# Patient Record
Sex: MALE | Race: WHITE | NOT HISPANIC OR LATINO | Employment: FULL TIME | URBAN - NONMETROPOLITAN AREA
[De-identification: names, ages, dates, MRNs, and addresses within clinical notes are randomized per-mention and may not be internally consistent; named-entity substitution may affect disease eponyms.]

---

## 2018-03-07 ENCOUNTER — TRANSCRIBE ORDERS (OUTPATIENT)
Dept: ADMINISTRATIVE | Facility: HOSPITAL | Age: 28
End: 2018-03-07

## 2018-03-07 DIAGNOSIS — M79.89 LEFT ARM SWELLING: Primary | ICD-10-CM

## 2018-03-08 ENCOUNTER — HOSPITAL ENCOUNTER (OUTPATIENT)
Dept: ULTRASOUND IMAGING | Facility: HOSPITAL | Age: 28
Discharge: HOME | End: 2018-03-08
Admitting: NURSE PRACTITIONER

## 2018-03-08 DIAGNOSIS — M79.89 LEFT ARM SWELLING: ICD-10-CM

## 2018-03-08 PROCEDURE — 93924 LWR XTR VASC STDY BILAT: CPT | Performed by: SURGERY

## 2018-03-08 PROCEDURE — 93923 UPR/LXTR ART STDY 3+ LVLS: CPT

## 2018-03-27 ENCOUNTER — TRANSCRIBE ORDERS (OUTPATIENT)
Dept: ADMINISTRATIVE | Facility: HOSPITAL | Age: 28
End: 2018-03-27

## 2018-03-27 DIAGNOSIS — R20.2 PARESTHESIAS IN LEFT HAND: Primary | ICD-10-CM

## 2018-05-10 ENCOUNTER — HOSPITAL ENCOUNTER (OUTPATIENT)
Dept: NEUROLOGY | Facility: HOSPITAL | Age: 28
Discharge: HOME | End: 2018-05-10
Admitting: PHYSICIAN ASSISTANT

## 2018-05-10 DIAGNOSIS — R20.2 PARESTHESIAS IN LEFT HAND: ICD-10-CM

## 2018-05-10 PROCEDURE — 95909 NRV CNDJ TST 5-6 STUDIES: CPT | Performed by: PSYCHIATRY & NEUROLOGY

## 2018-05-10 PROCEDURE — 95909 NRV CNDJ TST 5-6 STUDIES: CPT

## 2018-05-10 PROCEDURE — 95886 MUSC TEST DONE W/N TEST COMP: CPT | Performed by: PSYCHIATRY & NEUROLOGY

## 2018-05-10 PROCEDURE — 95886 MUSC TEST DONE W/N TEST COMP: CPT

## 2019-01-17 ENCOUNTER — APPOINTMENT (OUTPATIENT)
Dept: LAB | Facility: HOSPITAL | Age: 29
End: 2019-01-17
Attending: OBSTETRICS & GYNECOLOGY

## 2019-01-17 ENCOUNTER — TRANSCRIBE ORDERS (OUTPATIENT)
Dept: LAB | Facility: HOSPITAL | Age: 29
End: 2019-01-17

## 2019-01-17 DIAGNOSIS — Z31.41 FERTILITY TESTING: Primary | ICD-10-CM

## 2019-01-17 LAB
CHARACTER SMN: ABNORMAL
COLLECTION METHOD SMN: ABNORMAL
LIQUEFACTION TIME SMN: NORMAL MIN
NON PROGRESSIVE MOTILITY: 6.7 %
RBC #/AREA SMN HPF: ABNORMAL /HPF
SEX ABSTIN DURATION TIME PATIENT: 5 DAYS
SLOW PROGRESSIVE MOTILITY: 4.7 %
SMI: 26 (ref 80–400)
SPEC CONTAINER SPEC: ABNORMAL
SPECIMEN VOL SMN: 2.3 ML
SPERM # SMN: 55.7 MILLIONS/ML
SPERM IMMOTILE NFR SMN: 86.4 %
SPERM MOTILE NFR SMN: 13.6 % MOTILE (ref 50–100)
SPERM PROG NFR SMN: 2.2 % (ref 25–100)
SPERM SMN: 3 % NORMAL (ref 15–100)
VISC SMN: NORMAL CP
WBC SMN QL: ABNORMAL /HPF
WHO STANDARD: ABNORMAL

## 2019-01-17 PROCEDURE — 89320 SEMEN ANAL VOL/COUNT/MOT: CPT | Performed by: OBSTETRICS & GYNECOLOGY

## 2019-03-22 ENCOUNTER — TRANSCRIBE ORDERS (OUTPATIENT)
Dept: LABOR AND DELIVERY | Facility: HOSPITAL | Age: 29
End: 2019-03-22

## 2019-03-22 ENCOUNTER — APPOINTMENT (OUTPATIENT)
Dept: LAB | Facility: HOSPITAL | Age: 29
End: 2019-03-22

## 2019-03-22 DIAGNOSIS — R86.9 NONSPECIFIC ABNORMAL FINDING IN SEMEN: Primary | ICD-10-CM

## 2019-03-22 LAB
CHARACTER SMN: ABNORMAL
COLLECTION METHOD SMN: ABNORMAL
LIQUEFACTION TIME SMN: NORMAL MIN
NON PROGRESSIVE MOTILITY: 13.8 %
SEX ABSTIN DURATION TIME PATIENT: 5 DAYS
SLOW PROGRESSIVE MOTILITY: 24.4 %
SMI: 40 (ref 80–400)
SPEC CONTAINER SPEC: ABNORMAL
SPECIMEN VOL SMN: 1 ML
SPERM # SMN: 24.6 MILLIONS/ML
SPERM IMMOTILE NFR SMN: 55.3 %
SPERM MOTILE NFR SMN: 44.7 % MOTILE (ref 50–100)
SPERM PROG NFR SMN: 6.5 % (ref 25–100)
SPERM SMN: 5.8 % NORMAL (ref 15–100)
VISC SMN: NORMAL CP
WBC SMN QL: ABNORMAL /HPF
WHO STANDARD: ABNORMAL

## 2019-03-22 PROCEDURE — 89320 SEMEN ANAL VOL/COUNT/MOT: CPT | Performed by: OBSTETRICS & GYNECOLOGY

## 2019-07-11 ENCOUNTER — TRANSCRIBE ORDERS (OUTPATIENT)
Dept: ADMINISTRATIVE | Facility: HOSPITAL | Age: 29
End: 2019-07-11

## 2019-07-11 ENCOUNTER — APPOINTMENT (OUTPATIENT)
Dept: LAB | Facility: HOSPITAL | Age: 29
End: 2019-07-11

## 2019-07-11 DIAGNOSIS — N46.9 INFERTILITY MALE: Primary | ICD-10-CM

## 2019-07-11 LAB
CHARACTER SMN: NORMAL
COLLECTION METHOD SMN: ABNORMAL
EPI CELLS #/AREA SMN HPF: ABNORMAL /HPF
LIQUEFACTION TIME SMN: NORMAL MIN
NON PROGRESSIVE MOTILITY: 7.4 %
RBC #/AREA SMN HPF: ABNORMAL /HPF
SEX ABSTIN DURATION TIME PATIENT: 4 DAYS
SLOW PROGRESSIVE MOTILITY: 14.7 %
SMI: 71 (ref 80–400)
SPEC CONTAINER SPEC: ABNORMAL
SPECIMEN VOL SMN: 1 ML
SPERM # SMN: 113.8 MILLIONS/ML
SPERM IMMOTILE NFR SMN: 76.8 %
SPERM MOTILE NFR SMN: 23.2 % MOTILE (ref 50–100)
SPERM PROG NFR SMN: 1.1 % (ref 25–100)
SPERM SMN: 4.3 % NORMAL (ref 15–100)
VISC SMN: ABNORMAL CP
WBC SMN QL: ABNORMAL /HPF
WHO STANDARD: ABNORMAL

## 2019-07-11 PROCEDURE — 89320 SEMEN ANAL VOL/COUNT/MOT: CPT | Performed by: UROLOGY

## 2022-09-07 ENCOUNTER — OFFICE VISIT (OUTPATIENT)
Age: 32
End: 2022-09-07
Payer: COMMERCIAL

## 2022-09-07 VITALS
DIASTOLIC BLOOD PRESSURE: 88 MMHG | HEART RATE: 92 BPM | BODY MASS INDEX: 37.29 KG/M2 | WEIGHT: 251.8 LBS | OXYGEN SATURATION: 98 % | TEMPERATURE: 98.2 F | HEIGHT: 69 IN | SYSTOLIC BLOOD PRESSURE: 134 MMHG | RESPIRATION RATE: 17 BRPM

## 2022-09-07 DIAGNOSIS — H10.32 ACUTE CONJUNCTIVITIS OF LEFT EYE, UNSPECIFIED ACUTE CONJUNCTIVITIS TYPE: Primary | ICD-10-CM

## 2022-09-07 PROCEDURE — 99214 OFFICE O/P EST MOD 30 MIN: CPT | Performed by: NURSE PRACTITIONER

## 2022-09-07 RX ORDER — TOBRAMYCIN AND DEXAMETHASONE 3; 1 MG/ML; MG/ML
2 SUSPENSION/ DROPS OPHTHALMIC
Qty: 5 ML | Refills: 0 | Status: SHIPPED | OUTPATIENT
Start: 2022-09-07 | End: 2022-09-17

## 2022-09-07 ASSESSMENT — ENCOUNTER SYMPTOMS
COLOR CHANGE: 0
TROUBLE SWALLOWING: 0
EYE PAIN: 0
PHOTOPHOBIA: 1
EYE REDNESS: 1
EYE DISCHARGE: 0
ABDOMINAL PAIN: 0
STRIDOR: 0
ABDOMINAL DISTENTION: 0
SINUS PRESSURE: 0
WHEEZING: 0
COUGH: 0
SORE THROAT: 0
SHORTNESS OF BREATH: 0
CHEST TIGHTNESS: 0
EYE ITCHING: 1

## 2022-09-07 NOTE — PATIENT INSTRUCTIONS
Fluorescein and black light exam performed, no abrasions/scratches noted, eye was then flushed out. Patient noted improvement in vision clarity after flushing. Sent in tobradex for patient no improvement in 24 hours follow-up with eye doctor immediately.   Educated on worrisome signs and when to go to ER

## 2022-09-07 NOTE — PROGRESS NOTES
[address]  [address] [address] [place] [address]  Dept: [phone]  Dept Fax: [phone]  Loc: [phone]    Gio Prieto is a 34 y.o. male who presents today for his medical conditions/complaints as noted below. Gio Prieto is complaining of Eye Pain (Left eye has been irritated and sensitive  to the light since Monday night Tuesday Morning after he cut his hair Monday night)        HPI:   HPI    Macon Moment reports left eye irritation that started Tuesday morning. Patient reports that he cut his hair on Monday night and thinks there may be hair in his eye. He reports sensitivity to light. Denies matting. History reviewed. No pertinent past medical history. History reviewed. No pertinent surgical history. History reviewed. No pertinent family history. Social History     Tobacco Use    Smoking status: Never    Smokeless tobacco: Never   Substance Use Topics    Alcohol use: Not Currently     Alcohol/week: 1.0 standard drink     Types: 1 Cans of beer per week        Current Outpatient Medications   Medication Sig Dispense Refill    tobramycin-dexamethasone (TOBRADEX) 0.3-0.1 % ophthalmic suspension Place 2 drops into the left eye every 4 hours (while awake) for 10 days 5 mL 0     No current facility-administered medications for this visit.        No Known Allergies    Health Maintenance   Topic Date Due    COVID-19 Vaccine (1) Never done    Varicella vaccine (1 of 2 - 2-dose childhood series) Never done    Depression Screen  Never done    HIV screen  Never done    Hepatitis C screen  Never done    DTaP/Tdap/Td vaccine (1 - Tdap) Never done    Flu vaccine (1) Never done    Hepatitis A vaccine  Aged Out    Hepatitis B vaccine  Aged Out    Hib vaccine  Aged Out    Meningococcal (ACWY) vaccine  Aged Out    Pneumococcal 0-64 years Vaccine  Aged Out       Subjective:   Review of Systems   Constitutional:  Negative for chills, fatigue and fever. HENT:  Negative for congestion, sinus pressure, sore throat and trouble swallowing. Eyes:  Positive for photophobia, redness and itching. Negative for pain and discharge. Respiratory:  Negative for cough, chest tightness, shortness of breath, wheezing and stridor. Cardiovascular:  Negative for chest pain and palpitations. Gastrointestinal:  Negative for abdominal distention and abdominal pain. Genitourinary:  Negative for difficulty urinating, dysuria and hematuria. Musculoskeletal:  Negative for arthralgias, neck pain and neck stiffness. Skin:  Negative for color change and rash. Neurological:  Negative for dizziness, syncope, speech difficulty, weakness and numbness. Psychiatric/Behavioral:  Negative for confusion and suicidal ideas. Objective    Physical Exam  Vitals and nursing note reviewed. Constitutional:       General: He is not in acute distress. Appearance: Normal appearance. HENT:      Head: Normocephalic. Right Ear: External ear normal.      Left Ear: External ear normal.      Nose: Nose normal. No congestion. Mouth/Throat:      Mouth: Mucous membranes are moist.      Pharynx: Oropharynx is clear. No posterior oropharyngeal erythema. Eyes:      Conjunctiva/sclera: Conjunctivae normal.      Pupils: Pupils are equal, round, and reactive to light. Comments: Patient conjunctiva and sclera red and irritated. No scratch/abrasion identified with dye and black light. Cardiovascular:      Rate and Rhythm: Normal rate and regular rhythm. Pulses: Normal pulses. Heart sounds: Normal heart sounds. No murmur heard. Pulmonary:      Effort: Pulmonary effort is normal. No respiratory distress. Breath sounds: Normal breath sounds. No stridor. No wheezing. Abdominal:      General: Abdomen is flat. Bowel sounds are normal. There is no distension. Tenderness: There is no abdominal tenderness.    Musculoskeletal:         General: No swelling or deformity. Normal range of motion. Cervical back: Normal range of motion. No rigidity or tenderness. Skin:     General: Skin is warm and dry. Findings: No rash. Neurological:      General: No focal deficit present. Mental Status: He is alert and oriented to person, place, and time. Sensory: No sensory deficit. /88   Pulse 92   Temp 98.2 °F (36.8 °C)   Resp 17   Ht 5' 9\" (1.753 m)   Wt 251 lb 12.8 oz (114.2 kg)   SpO2 98%   BMI 37.18 kg/m²     Assessment         Diagnosis Orders   1. Acute conjunctivitis of left eye, unspecified acute conjunctivitis type  tobramycin-dexamethasone (TOBRADEX) 0.3-0.1 % ophthalmic suspension          Plan   Fluorescein and black light exam performed, no abrasions/scratches noted, eye was then flushed out. Patient noted improvement in vision clarity after flushing. Sent in tobradex for patient no improvement in 24 hours follow-up with eye doctor immediately. Educated on worrisome signs and when to go to ER  No orders of the defined types were placed in this encounter. No results found for this visit on 09/07/22. Orders Placed This Encounter   Medications    tobramycin-dexamethasone (TOBRADEX) 0.3-0.1 % ophthalmic suspension     Sig: Place 2 drops into the left eye every 4 hours (while awake) for 10 days     Dispense:  5 mL     Refill:  0      New Prescriptions    TOBRAMYCIN-DEXAMETHASONE (TOBRADEX) 0.3-0.1 % OPHTHALMIC SUSPENSION    Place 2 drops into the left eye every 4 hours (while awake) for 10 days        No follow-ups on file. Discussed use, benefits, and side effects of any prescribed medications. All patient questions were answered. Patient voiced understanding of care plan. Patient was given educational materials - see patient instructions below. Patient Instructions   Fluorescein and black light exam performed, no abrasions/scratches noted, eye was then flushed out.   Patient noted improvement in vision clarity after flushing. Sent in tobradex for patient no improvement in 24 hours follow-up with eye doctor immediately.   Educated on worrisome signs and when to go to ER        Electronically signed by MAXIMO Sylvester CNP on 9/7/2022 at 5:56 PM

## 2023-03-15 ENCOUNTER — OFFICE VISIT (OUTPATIENT)
Dept: INTERNAL MEDICINE | Facility: CLINIC | Age: 33
End: 2023-03-15
Payer: COMMERCIAL

## 2023-03-15 VITALS
DIASTOLIC BLOOD PRESSURE: 90 MMHG | HEIGHT: 70 IN | SYSTOLIC BLOOD PRESSURE: 132 MMHG | OXYGEN SATURATION: 98 % | TEMPERATURE: 96.8 F | WEIGHT: 244 LBS | HEART RATE: 72 BPM | BODY MASS INDEX: 34.93 KG/M2

## 2023-03-15 DIAGNOSIS — E55.9 VITAMIN D DEFICIENCY: Primary | ICD-10-CM

## 2023-03-15 DIAGNOSIS — R53.83 OTHER FATIGUE: ICD-10-CM

## 2023-03-15 DIAGNOSIS — E55.9 VITAMIN D DEFICIENCY: ICD-10-CM

## 2023-03-15 DIAGNOSIS — F33.9 EPISODE OF RECURRENT MAJOR DEPRESSIVE DISORDER, UNSPECIFIED DEPRESSION EPISODE SEVERITY: ICD-10-CM

## 2023-03-15 DIAGNOSIS — E66.9 OBESITY (BMI 35.0-39.9 WITHOUT COMORBIDITY): ICD-10-CM

## 2023-03-15 PROCEDURE — 99204 OFFICE O/P NEW MOD 45 MIN: CPT | Performed by: INTERNAL MEDICINE

## 2023-03-15 RX ORDER — FLUOXETINE HYDROCHLORIDE 20 MG/1
20 CAPSULE ORAL DAILY
Qty: 30 CAPSULE | Refills: 2 | Status: SHIPPED | OUTPATIENT
Start: 2023-03-15

## 2023-03-15 NOTE — PROGRESS NOTES
"      Chief Complaint  Anxiety (Ongoing issue, but seems to have worsened over the last few month ) and Depression    Subjective        Slade Shah presents to [facility]    HPI    Patient has had issues with depression for over a year.  Patient reports it impacts him at home significantly.  Patient does not have any SI/HI.  Patient has never been on medication before.  Patient reports some anxiety, but it is somewhat of more over stressing over certain issues.      Various treatment options discussed, I think fluoxetine would be a great option for him as more weight neutral and is adequate for depression and anxiety.  We discussed sexual side effects of these medications.    Not aware of his snores, not excessively tired.  Consider sleep apnea work-up if needed.    Review of Systems    Objective   Vital Signs:  /90 (BP Location: Left arm, Patient Position: Sitting, Cuff Size: Adult)   Pulse 72   Temp 96.8 °F (36 °C) (Temporal)   Ht 177.8 cm (70\")   Wt 111 kg (244 lb)   SpO2 98%   BMI 35.01 kg/m²   Estimated body mass index is 35.01 kg/m² as calculated from the following:    Height as of this encounter: 177.8 cm (70\").    Weight as of this encounter: 111 kg (244 lb).      Physical Exam  Vitals reviewed.   Constitutional:       Appearance: He is obese. He is not ill-appearing.   Cardiovascular:      Rate and Rhythm: Normal rate and regular rhythm.   Pulmonary:      Effort: Pulmonary effort is normal. No respiratory distress.   Skin:     General: Skin is warm and dry.   Neurological:      General: No focal deficit present.      Mental Status: He is alert and oriented to person, place, and time.   Psychiatric:         Mood and Affect: Mood normal.         Behavior: Behavior normal.                     Assessment and Plan   Diagnoses and all orders for this visit:    1. Vitamin D deficiency (Primary)  -     Vitamin D 25 hydroxy; Future    2. Other fatigue  -     CBC w " AUTO Differential; Future  -     TSH Rfx On Abnormal To Free T4; Future  -     Testosterone, Free, Total; Future  -     Vitamin B12; Future    3. Episode of recurrent major depressive disorder, unspecified depression episode severity (HCC)  -     FLUoxetine (PROzac) 20 MG capsule; Take 1 capsule by mouth Daily.  Dispense: 30 capsule; Refill: 2    4. Obesity (BMI 35.0-39.9 without comorbidity)  -     Comprehensive metabolic panel; Future  -     Hemoglobin A1c; Future  -     Lipid panel; Future      PHQ-9 Depression Screening  Little interest or pleasure in doing things? 2-->more than half the days   Feeling down, depressed, or hopeless? 2-->more than half the days   Trouble falling or staying asleep, or sleeping too much? 1-->several days   Feeling tired or having little energy? 1-->several days   Poor appetite or overeating? 0-->not at all   Feeling bad about yourself - or that you are a failure or have let yourself or your family down? 1-->several days   Trouble concentrating on things, such as reading the newspaper or watching television? 0-->not at all   Moving or speaking so slowly that other people could have noticed? Or the opposite - being so fidgety or restless that you have been moving around a lot more than usual? 1-->several days   Thoughts that you would be better off dead, or of hurting yourself in some way? 0-->not at all   PHQ-9 Total Score 8   If you checked off any problems, how difficult have these problems made it for you to do your work, take care of things at home, or get along with other people? somewhat difficult     Counseled on weight loss, increasing activity to get  mins of exercise per week.  Portion control.    Patient does not drink alcohol in excess.  Patient does vape, but is not interested in quitting at present.        Result Review :           Class 2 Severe Obesity (BMI >=35 and <=39.9). Obesity-related health conditions include the following: none. Obesity is improving with  lifestyle modifications. BMI is is above average; BMI management plan is completed. We discussed portion control, increasing exercise and an ervin-based approach such as MyFitness Pal or Lose It.           Follow Up   Return in about 1 month (around 4/15/2023), or if symptoms worsen or fail to improve, for Next scheduled follow up.  Patient was given instructions and counseling regarding his condition or for health maintenance advice. Please see specific information pulled into the AVS if appropriate.       CONY Lyn MD, FACP, FHM      Electronically signed by Alex Lyn MD, 03/15/23, 8:02 AM CDT.

## 2023-03-20 DIAGNOSIS — E55.9 VITAMIN D DEFICIENCY: Primary | ICD-10-CM

## 2023-03-20 LAB
25(OH)D3+25(OH)D2 SERPL-MCNC: 13.4 NG/ML (ref 30–100)
ALBUMIN SERPL-MCNC: 4.6 G/DL (ref 3.5–5.2)
ALBUMIN/GLOB SERPL: 1.5 G/DL
ALP SERPL-CCNC: 49 U/L (ref 39–117)
ALT SERPL-CCNC: 39 U/L (ref 1–41)
AST SERPL-CCNC: 31 U/L (ref 1–40)
BASOPHILS # BLD AUTO: 0.06 10*3/MM3 (ref 0–0.2)
BASOPHILS NFR BLD AUTO: 1 % (ref 0–1.5)
BILIRUB SERPL-MCNC: 1 MG/DL (ref 0–1.2)
BUN SERPL-MCNC: 16 MG/DL (ref 6–20)
BUN/CREAT SERPL: 16.7 (ref 7–25)
CALCIUM SERPL-MCNC: 10.1 MG/DL (ref 8.6–10.5)
CHLORIDE SERPL-SCNC: 102 MMOL/L (ref 98–107)
CHOLEST SERPL-MCNC: 144 MG/DL (ref 0–200)
CO2 SERPL-SCNC: 25.1 MMOL/L (ref 22–29)
CREAT SERPL-MCNC: 0.96 MG/DL (ref 0.76–1.27)
EGFRCR SERPLBLD CKD-EPI 2021: 107 ML/MIN/1.73
EOSINOPHIL # BLD AUTO: 0.11 10*3/MM3 (ref 0–0.4)
EOSINOPHIL NFR BLD AUTO: 1.8 % (ref 0.3–6.2)
ERYTHROCYTE [DISTWIDTH] IN BLOOD BY AUTOMATED COUNT: 13 % (ref 12.3–15.4)
GLOBULIN SER CALC-MCNC: 3.1 GM/DL
GLUCOSE SERPL-MCNC: 96 MG/DL (ref 65–99)
HBA1C MFR BLD: 5.5 % (ref 4.8–5.6)
HCT VFR BLD AUTO: 44.2 % (ref 37.5–51)
HDLC SERPL-MCNC: 31 MG/DL (ref 40–60)
HGB BLD-MCNC: 14.6 G/DL (ref 13–17.7)
IMM GRANULOCYTES # BLD AUTO: 0.03 10*3/MM3 (ref 0–0.05)
IMM GRANULOCYTES NFR BLD AUTO: 0.5 % (ref 0–0.5)
LDLC SERPL CALC-MCNC: 99 MG/DL (ref 0–100)
LYMPHOCYTES # BLD AUTO: 2.29 10*3/MM3 (ref 0.7–3.1)
LYMPHOCYTES NFR BLD AUTO: 38 % (ref 19.6–45.3)
MCH RBC QN AUTO: 29 PG (ref 26.6–33)
MCHC RBC AUTO-ENTMCNC: 33 G/DL (ref 31.5–35.7)
MCV RBC AUTO: 87.9 FL (ref 79–97)
MONOCYTES # BLD AUTO: 0.48 10*3/MM3 (ref 0.1–0.9)
MONOCYTES NFR BLD AUTO: 8 % (ref 5–12)
NEUTROPHILS # BLD AUTO: 3.06 10*3/MM3 (ref 1.7–7)
NEUTROPHILS NFR BLD AUTO: 50.7 % (ref 42.7–76)
NRBC BLD AUTO-RTO: 0 /100 WBC (ref 0–0.2)
PLATELET # BLD AUTO: 449 10*3/MM3 (ref 140–450)
POTASSIUM SERPL-SCNC: 5 MMOL/L (ref 3.5–5.2)
PROT SERPL-MCNC: 7.7 G/DL (ref 6–8.5)
RBC # BLD AUTO: 5.03 10*6/MM3 (ref 4.14–5.8)
SODIUM SERPL-SCNC: 139 MMOL/L (ref 136–145)
TESTOST FREE SERPL-MCNC: 15.8 PG/ML (ref 8.7–25.1)
TESTOST SERPL-MCNC: 600 NG/DL (ref 264–916)
TRIGL SERPL-MCNC: 71 MG/DL (ref 0–150)
TSH SERPL DL<=0.005 MIU/L-ACNC: 0.8 UIU/ML (ref 0.27–4.2)
VIT B12 SERPL-MCNC: 365 PG/ML (ref 211–946)
VLDLC SERPL CALC-MCNC: 14 MG/DL (ref 5–40)
WBC # BLD AUTO: 6.03 10*3/MM3 (ref 3.4–10.8)

## 2023-03-20 RX ORDER — ERGOCALCIFEROL 1.25 MG/1
50000 CAPSULE ORAL WEEKLY
Qty: 5 CAPSULE | Refills: 0 | Status: SHIPPED | OUTPATIENT
Start: 2023-03-20

## 2023-04-13 ENCOUNTER — OFFICE VISIT (OUTPATIENT)
Dept: INTERNAL MEDICINE | Facility: CLINIC | Age: 33
End: 2023-04-13
Payer: COMMERCIAL

## 2023-04-13 ENCOUNTER — PATIENT ROUNDING (BHMG ONLY) (OUTPATIENT)
Dept: INTERNAL MEDICINE | Facility: CLINIC | Age: 33
End: 2023-04-13
Payer: COMMERCIAL

## 2023-04-13 VITALS
OXYGEN SATURATION: 99 % | HEIGHT: 70 IN | HEART RATE: 78 BPM | TEMPERATURE: 97.8 F | SYSTOLIC BLOOD PRESSURE: 124 MMHG | DIASTOLIC BLOOD PRESSURE: 100 MMHG | WEIGHT: 240 LBS | BODY MASS INDEX: 34.36 KG/M2

## 2023-04-13 DIAGNOSIS — F41.9 ANXIETY: ICD-10-CM

## 2023-04-13 DIAGNOSIS — E55.9 VITAMIN D DEFICIENCY: ICD-10-CM

## 2023-04-13 DIAGNOSIS — E66.09 CLASS 1 OBESITY DUE TO EXCESS CALORIES WITH SERIOUS COMORBIDITY AND BODY MASS INDEX (BMI) OF 34.0 TO 34.9 IN ADULT: Primary | ICD-10-CM

## 2023-04-13 PROBLEM — E66.811 CLASS 1 OBESITY DUE TO EXCESS CALORIES WITH SERIOUS COMORBIDITY AND BODY MASS INDEX (BMI) OF 34.0 TO 34.9 IN ADULT: Status: ACTIVE | Noted: 2023-04-13

## 2023-04-13 PROCEDURE — 99213 OFFICE O/P EST LOW 20 MIN: CPT | Performed by: INTERNAL MEDICINE

## 2023-04-13 NOTE — PROGRESS NOTES
"      Chief Complaint  Follow-up (Follow up from 3/15/2023 prozac /Anxiety doing much better )    Subjective        Slade Shah presents to [facility]    HPI    Patient presents for follow-up.  Patient is down 4 pounds.  The patient's anxiety is much better.  Patient tolerating the fluoxetine without any significant issues.  Patient did not know that he had a prescription for the vitamin D that was sent in last month.  Recommended that he pick this up, if he does not pick this up recommend getting over-the-counter vitamin D.        Review of Systems  Answers for HPI/ROS submitted by the patient on 4/13/2023  What is the primary reason for your visit?: Other  Please describe your symptoms.: No symptoms  Have you had these symptoms before?: No  How long have you been having these symptoms?: 1-4 days  Please list any medications you are currently taking for this condition.: Fluoxetine      Objective   Vital Signs:  /100 (BP Location: Left arm, Patient Position: Sitting, Cuff Size: Adult)   Pulse 78   Temp 97.8 °F (36.6 °C) (Temporal)   Ht 177.8 cm (70\")   Wt 109 kg (240 lb)   SpO2 99%   BMI 34.44 kg/m²   Estimated body mass index is 34.44 kg/m² as calculated from the following:    Height as of this encounter: 177.8 cm (70\").    Weight as of this encounter: 109 kg (240 lb).      Physical Exam  Vitals reviewed.   Constitutional:       Appearance: He is obese.   HENT:      Mouth/Throat:      Mouth: Mucous membranes are dry.      Pharynx: Oropharynx is clear.   Eyes:      General: No scleral icterus.     Conjunctiva/sclera: Conjunctivae normal.   Cardiovascular:      Rate and Rhythm: Normal rate and regular rhythm.   Pulmonary:      Effort: Pulmonary effort is normal. No respiratory distress.   Skin:     General: Skin is warm and dry.   Neurological:      General: No focal deficit present.      Mental Status: He is alert and oriented to person, place, and time. "   Psychiatric:         Mood and Affect: Mood normal.         Behavior: Behavior normal.                     Assessment and Plan   Diagnoses and all orders for this visit:    1. Class 1 obesity due to excess calories with serious comorbidity and body mass index (BMI) of 34.0 to 34.9 in adult (Primary)    2. Anxiety    3. Vitamin D deficiency      Patient seems to be tolerating the medication and doing well.  Recommend we continue the Prozac at this point time.  Patient is not having any significant adverse effects.  Encouraged the patient to be active, exercise, and work on weight loss as this also can help with anxiety and depression.  Also discussed with the patient making sure to  the vitamin D, if I need to resend the prescription I will be happy to do so.      Result Review :  The following data was reviewed by: Alex Lyn MD on 04/13/2023:  CMP        3/15/2023    07:08   CMP   Glucose 96     BUN 16     Creatinine 0.96     Sodium 139     Potassium 5.0     Chloride 102     Calcium 10.1     Total Protein 7.7     Albumin 4.6     Globulin 3.1     Total Bilirubin 1.0     Alkaline Phosphatase 49     AST (SGOT) 31     ALT (SGPT) 39     BUN/Creatinine Ratio 16.7       CBC w/diff        3/15/2023    07:08   CBC w/Diff   WBC 6.03     RBC 5.03     Hemoglobin 14.6     Hematocrit 44.2     MCV 87.9     MCH 29.0     MCHC 33.0     RDW 13.0     Platelets 449     Neutrophil Rel % 50.7     Lymphocyte Rel % 38.0     Monocyte Rel % 8.0     Eosinophil Rel % 1.8     Basophil Rel % 1.0       Lipid Panel        3/15/2023    07:08   Lipid Panel   Total Cholesterol 144     Triglycerides 71     HDL Cholesterol 31     VLDL Cholesterol 14     LDL Cholesterol  99       TSH        3/15/2023    07:08   TSH   TSH 0.797       A1C Last 3 Results        3/15/2023    07:08   HGBA1C Last 3 Results   Hemoglobin A1C 5.50                       Follow Up   Return in about 3 months (around 7/13/2023), or if symptoms worsen or fail to  improve, for Recheck.  Patient was given instructions and counseling regarding his condition or for health maintenance advice. Please see specific information pulled into the AVS if appropriate.       CONY Lyn MD, FACP, [facility]      Electronically signed by Alex Lyn MD, 04/13/23, 8:02 AM CDT.

## 2023-04-13 NOTE — PROGRESS NOTES
A My-Chart message has been sent to the patient for PATIENT ROUNDING with [facility].

## 2023-05-05 ENCOUNTER — TELEPHONE (OUTPATIENT)
Dept: INTERNAL MEDICINE | Facility: CLINIC | Age: 33
End: 2023-05-05

## 2023-05-05 DIAGNOSIS — E55.9 VITAMIN D DEFICIENCY: ICD-10-CM

## 2023-05-05 RX ORDER — ERGOCALCIFEROL 1.25 MG/1
50000 CAPSULE ORAL WEEKLY
Qty: 5 CAPSULE | Refills: 0 | Status: CANCELLED | OUTPATIENT
Start: 2023-05-05

## 2023-05-05 NOTE — TELEPHONE ENCOUNTER
Caller: Slade Shah    Relationship: Self    Best call back number: [phone]    What is the best time to reach you: ANY    Who are you requesting to speak with (clinical staff, provider,  specific staff member): CLINICAL       What was the call regarding: NO REFILLS REMAINING, DOES HE STILL NEED TO CONTINUE THE VIT. D.  PLEASE ADVISE.      Do you require a callback: YES

## 2023-06-27 ENCOUNTER — TELEPHONE (OUTPATIENT)
Dept: INTERNAL MEDICINE | Facility: CLINIC | Age: 33
End: 2023-06-27

## 2023-06-27 NOTE — TELEPHONE ENCOUNTER
Caller: Slade Shah    Relationship: Self    Best call back number: [phone]     What is the best time to reach you: ANY    Who are you requesting to speak with (clinical staff, provider,  specific staff member): CLINICAL    Do you know the name of the person who called: SELF    What was the call regarding: NEEDS TO CONFIRM IF THE APPOINTMENT ON 7.3.23 IS REQUIRED SINCE PATIENT IS OUT OF REFILLS OR HE CAN WAIT UNTIL THE 7.31.23 IF REFILLS CAN BE SENT IN.     Is it okay if the provider responds through [employer]: YES

## 2024-01-12 ENCOUNTER — OFFICE VISIT (OUTPATIENT)
Dept: INTERNAL MEDICINE | Facility: CLINIC | Age: 34
End: 2024-01-12
Payer: COMMERCIAL

## 2024-01-12 VITALS
DIASTOLIC BLOOD PRESSURE: 90 MMHG | TEMPERATURE: 98.1 F | OXYGEN SATURATION: 98 % | BODY MASS INDEX: 35.39 KG/M2 | HEIGHT: 70 IN | WEIGHT: 247.2 LBS | HEART RATE: 83 BPM | SYSTOLIC BLOOD PRESSURE: 142 MMHG

## 2024-01-12 DIAGNOSIS — I10 PRIMARY HYPERTENSION: ICD-10-CM

## 2024-01-12 DIAGNOSIS — N46.9 MALE INFERTILITY: Primary | ICD-10-CM

## 2024-01-12 DIAGNOSIS — Z23 NEED FOR INFLUENZA VACCINATION: ICD-10-CM

## 2024-01-12 DIAGNOSIS — E55.9 VITAMIN D DEFICIENCY: ICD-10-CM

## 2024-01-12 DIAGNOSIS — E66.01 CLASS 2 SEVERE OBESITY DUE TO EXCESS CALORIES WITH SERIOUS COMORBIDITY AND BODY MASS INDEX (BMI) OF 35.0 TO 35.9 IN ADULT: ICD-10-CM

## 2024-01-12 DIAGNOSIS — F41.9 ANXIETY: ICD-10-CM

## 2024-01-12 PROBLEM — E66.09 CLASS 1 OBESITY DUE TO EXCESS CALORIES WITH SERIOUS COMORBIDITY AND BODY MASS INDEX (BMI) OF 34.0 TO 34.9 IN ADULT: Status: RESOLVED | Noted: 2023-04-13 | Resolved: 2024-01-12

## 2024-01-12 PROBLEM — E66.811 CLASS 1 OBESITY DUE TO EXCESS CALORIES WITH SERIOUS COMORBIDITY AND BODY MASS INDEX (BMI) OF 34.0 TO 34.9 IN ADULT: Status: RESOLVED | Noted: 2023-04-13 | Resolved: 2024-01-12

## 2024-01-12 PROBLEM — E66.812 CLASS 2 SEVERE OBESITY DUE TO EXCESS CALORIES WITH SERIOUS COMORBIDITY AND BODY MASS INDEX (BMI) OF 35.0 TO 35.9 IN ADULT: Status: ACTIVE | Noted: 2024-01-12

## 2024-01-12 RX ORDER — TRIAMCINOLONE ACETONIDE 1 MG/G
25 CREAM TOPICAL DAILY
Qty: 30 TABLET | Refills: 0 | Status: SHIPPED | OUTPATIENT
Start: 2024-01-12

## 2024-01-12 NOTE — PROGRESS NOTES
"      Chief Complaint  Depression (6 month follow up//Pts next appt after 3/15 needs to be an annual.//Pt received flu vaccine today.) and Vitamin D Deficiency    Subjective        Slademichael Shah presents to [facility]    HPI  Patient here for the above problems.  See Assessment and Plan for further HPI components.        Review of Systems    Objective   Vital Signs:  /90 (BP Location: Left arm, Patient Position: Sitting, Cuff Size: Adult)   Pulse 83   Temp 98.1 °F (36.7 °C) (Temporal)   Ht 177.8 cm (70\")   Wt 112 kg (247 lb 3.2 oz)   SpO2 98%   BMI 35.47 kg/m²   Estimated body mass index is 35.47 kg/m² as calculated from the following:    Height as of this encounter: 177.8 cm (70\").    Weight as of this encounter: 112 kg (247 lb 3.2 oz).      Physical Exam  Vitals reviewed.   Constitutional:       Appearance: He is obese.   Cardiovascular:      Rate and Rhythm: Normal rate and regular rhythm.   Pulmonary:      Effort: Pulmonary effort is normal. No respiratory distress.   Neurological:      General: No focal deficit present.      Mental Status: He is alert and oriented to person, place, and time.   Psychiatric:         Mood and Affect: Mood normal.         Behavior: Behavior normal.                       Assessment and Plan   Diagnoses and all orders for this visit:    1. Male infertility (Primary)  -     clomiPHENE (Clomid) 50 MG tablet; Take 0.5 tablets by mouth Daily.  Dispense: 30 tablet; Refill: 0    2. Vitamin D deficiency  -     Vitamin D,25-Hydroxy    3. Need for influenza vaccination  -     Fluzone (or Fluarix & Flulaval for VFC) >6mos    4. Anxiety    5. Class 2 severe obesity due to excess calories with serious comorbidity and body mass index (BMI) of 35.0 to 35.9 in adult    6. Primary hypertension      Anxiety stable.  No issues.  Tolerating prozac.  Sometimes still irritable but not outside the realm of normal behavior.  Continue prozac.    Vitamin D, he " has been taking medication.  Patient feels better.  Will check level today.    Patient has a history of infertility.  He went to specialist and was found to have low sperm count.  Patient was treated with clomid and had good success with this.  He would prefer not to go back to the specialist if he doesn't have to.  I will go ahead and prescribe low dose clomide.  Results on utility of offlabel use clomid has been variable.  But since he tolerated and had success previously, will go ahead and give it a try.  Will start with 60 days.      Patient has hypertension.  BP is not at goal  The patient's BP goal is < 130/80.  Recommend ambulatory BP monitoring after patient has taken medication.  Recommend varying the times of the blood pressure readings.  Patient is currently on none.  Recommend we continue current regimen.  Recommend ambulatory BP monitoring.  Recommend checking different times of the day at least 3-5 times per weeks.  [email or web address].[email or web address] has listing of some good BP cuffs.       Result Review :                   Class 2 Severe Obesity (BMI >=35 and <=39.9). Obesity-related health conditions include the following: hypertension. Obesity is worsening. BMI is is above average; BMI management plan is completed. We discussed portion control and increasing exercise.            Follow Up   Return in about 6 months (around 7/12/2024), or if symptoms worsen or fail to improve, for Annual physical, Recheck.  Patient was given instructions and counseling regarding his condition or for health maintenance advice. Please see specific information pulled into the AVS if appropriate.       CONY Lyn MD, FACP, [facility]      Electronically signed by Alex Lyn MD, 01/12/24, 1:03 PM CST.

## 2024-01-13 LAB — 25(OH)D3+25(OH)D2 SERPL-MCNC: 39.1 NG/ML (ref 30–100)

## 2024-02-05 DIAGNOSIS — F33.9 EPISODE OF RECURRENT MAJOR DEPRESSIVE DISORDER, UNSPECIFIED DEPRESSION EPISODE SEVERITY: ICD-10-CM

## 2024-02-05 RX ORDER — FLUOXETINE HYDROCHLORIDE 20 MG/1
20 CAPSULE ORAL DAILY
Qty: 90 CAPSULE | Refills: 3 | Status: SHIPPED | OUTPATIENT
Start: 2024-02-05

## 2024-07-03 DIAGNOSIS — F33.9 EPISODE OF RECURRENT MAJOR DEPRESSIVE DISORDER, UNSPECIFIED DEPRESSION EPISODE SEVERITY: ICD-10-CM

## 2024-07-03 RX ORDER — FLUOXETINE HYDROCHLORIDE 20 MG/1
20 CAPSULE ORAL DAILY
Qty: 30 CAPSULE | Refills: 11 | OUTPATIENT
Start: 2024-07-03

## 2024-07-05 DIAGNOSIS — F33.9 EPISODE OF RECURRENT MAJOR DEPRESSIVE DISORDER, UNSPECIFIED DEPRESSION EPISODE SEVERITY: ICD-10-CM

## 2024-07-05 RX ORDER — FLUOXETINE HYDROCHLORIDE 20 MG/1
20 CAPSULE ORAL DAILY
Qty: 90 CAPSULE | Refills: 3 | Status: SHIPPED | OUTPATIENT
Start: 2024-07-05

## 2024-07-05 RX ORDER — FLUOXETINE HYDROCHLORIDE 20 MG/1
20 CAPSULE ORAL DAILY
Qty: 30 CAPSULE | Refills: 11 | OUTPATIENT
Start: 2024-07-05

## 2024-07-05 NOTE — TELEPHONE ENCOUNTER
Caller: Slade Shah    Relationship: Self    Best call back number: [phone]     Requested Prescriptions:   Requested Prescriptions     Pending Prescriptions Disp Refills    FLUoxetine (PROzac) 20 MG capsule 90 capsule 3     Sig: Take 1 capsule by mouth Daily.        Pharmacy where request should be sent: [facility] [address] - [phone] PH - [phone] FX     Last office visit with prescribing clinician: 1/12/2024   Last telemedicine visit with prescribing clinician: Visit date not found   Next office visit with prescribing clinician: 7/15/2024     Additional details provided by patient: PATIENT HAS NO REFILLS AND DOESN'T HAVE ENOUGH TO MAKE IT UNTIL HE COMES IN FOR HIS APPOINTMENT. PLEASE SEND IN EITHER A FULL REFILL OR A FEW PILLS SO HE CAN MAKE IT UNTIL HE COMES IN.     Does the patient have less than a 3 day supply:  [x] Yes  [] No    Would you like a call back once the refill request has been completed: [] Yes [x] No    If the office needs to give you a call back, can they leave a voicemail: [] Yes [x] No    Racheal Brush Rep   07/05/24 11:47 CDT

## 2024-08-09 ENCOUNTER — LAB (OUTPATIENT)
Dept: INTERNAL MEDICINE | Facility: CLINIC | Age: 34
End: 2024-08-09
Payer: COMMERCIAL

## 2024-08-09 ENCOUNTER — TELEPHONE (OUTPATIENT)
Dept: INTERNAL MEDICINE | Facility: CLINIC | Age: 34
End: 2024-08-09

## 2024-08-09 DIAGNOSIS — Z00.00 WELLNESS EXAMINATION: Primary | ICD-10-CM

## 2024-08-09 NOTE — TELEPHONE ENCOUNTER
Caller: Slade Shah    Relationship to patient: Self    Best call back number: [phone]     Type of visit: LAB WORK    Requested date: ASAP        Additional notes: HAS PHYSICAL ON 8-13-24

## 2024-08-10 LAB
ALBUMIN SERPL-MCNC: 4.4 G/DL (ref 4.1–5.1)
ALP SERPL-CCNC: 35 IU/L (ref 44–121)
ALT SERPL-CCNC: 48 IU/L (ref 0–44)
APPEARANCE UR: CLEAR
AST SERPL-CCNC: 29 IU/L (ref 0–40)
BACTERIA #/AREA URNS HPF: NORMAL /[HPF]
BASOPHILS # BLD AUTO: 0.1 X10E3/UL (ref 0–0.2)
BASOPHILS NFR BLD AUTO: 1 %
BILIRUB SERPL-MCNC: 1.2 MG/DL (ref 0–1.2)
BILIRUB UR QL STRIP: NEGATIVE
BUN SERPL-MCNC: 15 MG/DL (ref 6–20)
BUN/CREAT SERPL: 14 (ref 9–20)
CALCIUM SERPL-MCNC: 9.5 MG/DL (ref 8.7–10.2)
CASTS URNS QL MICRO: NORMAL /LPF
CHLORIDE SERPL-SCNC: 105 MMOL/L (ref 96–106)
CHOLEST SERPL-MCNC: 210 MG/DL (ref 100–199)
CO2 SERPL-SCNC: 20 MMOL/L (ref 20–29)
COLOR UR: YELLOW
CREAT SERPL-MCNC: 1.04 MG/DL (ref 0.76–1.27)
EGFRCR SERPLBLD CKD-EPI 2021: 96 ML/MIN/1.73
EOSINOPHIL # BLD AUTO: 0.1 X10E3/UL (ref 0–0.4)
EOSINOPHIL NFR BLD AUTO: 2 %
EPI CELLS #/AREA URNS HPF: NORMAL /HPF (ref 0–10)
ERYTHROCYTE [DISTWIDTH] IN BLOOD BY AUTOMATED COUNT: 13.6 % (ref 11.6–15.4)
GLOBULIN SER CALC-MCNC: 2.5 G/DL (ref 1.5–4.5)
GLUCOSE SERPL-MCNC: 90 MG/DL (ref 70–99)
GLUCOSE UR QL STRIP: NEGATIVE
HCT VFR BLD AUTO: 45.9 % (ref 37.5–51)
HDLC SERPL-MCNC: 34 MG/DL
HGB BLD-MCNC: 14.8 G/DL (ref 13–17.7)
HGB UR QL STRIP: NEGATIVE
IMM GRANULOCYTES # BLD AUTO: 0 X10E3/UL (ref 0–0.1)
IMM GRANULOCYTES NFR BLD AUTO: 0 %
KETONES UR QL STRIP: NEGATIVE
LDLC SERPL CALC-MCNC: 152 MG/DL (ref 0–99)
LEUKOCYTE ESTERASE UR QL STRIP: NEGATIVE
LYMPHOCYTES # BLD AUTO: 3.2 X10E3/UL (ref 0.7–3.1)
LYMPHOCYTES NFR BLD AUTO: 39 %
MCH RBC QN AUTO: 29.5 PG (ref 26.6–33)
MCHC RBC AUTO-ENTMCNC: 32.2 G/DL (ref 31.5–35.7)
MCV RBC AUTO: 91 FL (ref 79–97)
MICRO URNS: ABNORMAL
MICRO URNS: ABNORMAL
MONOCYTES # BLD AUTO: 0.7 X10E3/UL (ref 0.1–0.9)
MONOCYTES NFR BLD AUTO: 8 %
NEUTROPHILS # BLD AUTO: 4.1 X10E3/UL (ref 1.4–7)
NEUTROPHILS NFR BLD AUTO: 50 %
NITRITE UR QL STRIP: NEGATIVE
PH UR STRIP: 5.5 [PH] (ref 5–7.5)
PLATELET # BLD AUTO: 385 X10E3/UL (ref 150–450)
POTASSIUM SERPL-SCNC: 4.7 MMOL/L (ref 3.5–5.2)
PROT SERPL-MCNC: 6.9 G/DL (ref 6–8.5)
PROT UR QL STRIP: ABNORMAL
RBC # BLD AUTO: 5.02 X10E6/UL (ref 4.14–5.8)
RBC #/AREA URNS HPF: NORMAL /HPF (ref 0–2)
SODIUM SERPL-SCNC: 139 MMOL/L (ref 134–144)
SP GR UR STRIP: >=1.03 (ref 1–1.03)
TRIGL SERPL-MCNC: 129 MG/DL (ref 0–149)
TSH SERPL DL<=0.005 MIU/L-ACNC: 0.57 UIU/ML (ref 0.45–4.5)
UROBILINOGEN UR STRIP-MCNC: 0.2 MG/DL (ref 0.2–1)
VLDLC SERPL CALC-MCNC: 24 MG/DL (ref 5–40)
WBC # BLD AUTO: 8.2 X10E3/UL (ref 3.4–10.8)
WBC #/AREA URNS HPF: NORMAL /HPF (ref 0–5)

## 2024-08-13 ENCOUNTER — OFFICE VISIT (OUTPATIENT)
Dept: INTERNAL MEDICINE | Facility: CLINIC | Age: 34
End: 2024-08-13
Payer: COMMERCIAL

## 2024-08-13 VITALS
HEART RATE: 77 BPM | BODY MASS INDEX: 38.74 KG/M2 | TEMPERATURE: 97.8 F | HEIGHT: 70 IN | DIASTOLIC BLOOD PRESSURE: 88 MMHG | SYSTOLIC BLOOD PRESSURE: 132 MMHG | WEIGHT: 270.6 LBS | OXYGEN SATURATION: 96 %

## 2024-08-13 DIAGNOSIS — E66.01 CLASS 2 SEVERE OBESITY DUE TO EXCESS CALORIES WITH SERIOUS COMORBIDITY AND BODY MASS INDEX (BMI) OF 38.0 TO 38.9 IN ADULT: Primary | ICD-10-CM

## 2024-08-13 DIAGNOSIS — I10 PRIMARY HYPERTENSION: ICD-10-CM

## 2024-08-13 DIAGNOSIS — N46.9 MALE INFERTILITY: ICD-10-CM

## 2024-08-13 DIAGNOSIS — F41.9 ANXIETY: ICD-10-CM

## 2024-08-13 DIAGNOSIS — E78.5 HYPERLIPIDEMIA, UNSPECIFIED HYPERLIPIDEMIA TYPE: ICD-10-CM

## 2024-08-13 DIAGNOSIS — E55.9 VITAMIN D DEFICIENCY: ICD-10-CM

## 2024-08-13 DIAGNOSIS — R74.8 ELEVATED LIVER ENZYMES: ICD-10-CM

## 2024-08-13 RX ORDER — TRIAMCINOLONE ACETONIDE 1 MG/G
25 CREAM TOPICAL DAILY
Qty: 30 TABLET | Refills: 1 | Status: SHIPPED | OUTPATIENT
Start: 2024-08-13

## 2024-08-13 NOTE — PROGRESS NOTES
"      Chief Complaint  Annual Exam (Wants to talk about the Clomid.)    Subjective        Slade Shah presents to [facility]    HPI    Patient here for the above problems.  See Assessment and Plan for further HPI components.      Review of Systems    Objective   Vital Signs:  /88 (BP Location: Left arm, Patient Position: Sitting, Cuff Size: Large Adult)   Pulse 77   Temp 97.8 °F (36.6 °C) (Temporal)   Ht 177.8 cm (70\")   Wt 123 kg (270 lb 9.6 oz)   SpO2 96%   BMI 38.83 kg/m²   Estimated body mass index is 38.83 kg/m² as calculated from the following:    Height as of this encounter: 177.8 cm (70\").    Weight as of this encounter: 123 kg (270 lb 9.6 oz).      Physical Exam  Vitals and nursing note reviewed.   Constitutional:       Appearance: He is obese. He is not ill-appearing.   Eyes:      General: No scleral icterus.     Conjunctiva/sclera: Conjunctivae normal.   Cardiovascular:      Rate and Rhythm: Normal rate and regular rhythm.   Pulmonary:      Effort: Pulmonary effort is normal. No respiratory distress.   Neurological:      General: No focal deficit present.      Mental Status: He is alert and oriented to person, place, and time.   Psychiatric:         Mood and Affect: Mood normal.         Behavior: Behavior normal.                     Assessment and Plan   Diagnoses and all orders for this visit:    1. Class 2 severe obesity due to excess calories with serious comorbidity and body mass index (BMI) of 38.0 to 38.9 in adult (Primary)    2. Male infertility  -     clomiPHENE (Clomid) 50 MG tablet; Take 0.5 tablets by mouth Daily.  Dispense: 30 tablet; Refill: 1    3. Vitamin D deficiency    4. Anxiety    5. Primary hypertension    6. Hyperlipidemia, unspecified hyperlipidemia type  -     Lipid panel; Future    7. Elevated liver enzymes  -     Comprehensive metabolic panel; Future        Recommend at least annual dental and vision screening.  Recommend annual " influenza vaccination  Recommend a varied diet and appropriate portion sizes.   CDC recommendations for physical activity:  At least 150 minutes a week (for example, 30 minutes a day, 5 days a week) of moderate-intensity activity such as brisk walking. Or can consider 75 minutes a week of vigorous-intensity activity such as hiking, jogging, or running.  At least 2 days a week of activities that strengthen muscles.  Plus activities to improve balance.    Health Maintenance Summary            Overdue - HEPATITIS C SCREENING (Once) Never done      No completion, postpone, or frequency change history exists for this topic.              Overdue - COVID-19 Vaccine (1 - 2023-24 season) Never done      03/15/2023  Postponed until 3/17/2030 by Adelina Montoya MA (Patient Refused)              INFLUENZA VACCINE (Yearly - August to March) Due since 8/1/2024 01/12/2024  Imm Admin: Fluzone (or Fluarix & Flulaval for VFC) >6mos    03/15/2023  Postponed until 3/31/2024 by Adelina Montoya MA (Patient Refused)    09/11/2020  Imm Admin: Fluzone (or Fluarix & Flulaval for VFC) >6mos              BMI FOLLOWUP (Yearly) Next due on 1/12/2025 01/12/2024  SmartData: WORKFLOW - QUALITY MEASUREMENT - DOCUMENTED WEIGHT FOLLOW-UP PLAN    01/12/2024  SmartData: WORKFLOW - QUALITY MEASUREMENT - BMI FOLLOW UP CARE PLAN DOCUMENTED    03/15/2023  SmartData: WORKFLOW - QUALITY MEASUREMENT - BMI FOLLOW UP CARE PLAN DOCUMENTED    03/15/2023  SmartData: WORKFLOW - QUALITY MEASUREMENT - DOCUMENTED WEIGHT FOLLOW-UP PLAN              Ordered - LIPID PANEL (Yearly) Ordered on 8/13/2024 08/09/2024  Lipid Panel    03/15/2023  Lipid panel              ANNUAL PHYSICAL (Yearly) Next due on 8/13/2025 08/13/2024  Done              TDAP/TD VACCINES (3 - Td or Tdap) Next due on 3/15/2031      03/15/2021  Imm Admin: Tdap    03/19/2004  Imm Admin: Td (TDVAX)              Pneumococcal Vaccine 0-64 (Series Information) Aged Out      No  completion, postpone, or frequency change history exists for this topic.                    Patient has a BMI > 30.  Obesity increases risks of diseases such as diabetes, coronary artery disease, hypertension, sleep apnea, hyperlipidemia, arthritis, and stroke.  Recommend focusing on portion control and making healthy diet choices.  Avoid fast food.  Avoid calorie beverages including sweet tea, juice, soft drinks, and alcohol.  Recommend increasing your activity and starting a regular exercise program. Can consider utilizing calorie counting apps such as [employer].  Patient has gained a significant amount of weight and LFTs and lipids have increased in relation.  The patient encouraged to focus on weight loss and calorie reduction.  Increase activity as tolerated.    Patient would like to start back on clomid.  They are in the process of trying to have a kid again.  Patient has infertility issues and has had sperm analysis previously.      Anxiety stable.  Continue on current medication.  He actually reports significant improvement.      Result Review :  The following data was reviewed by: Alex Lyn MD on 08/13/2024:   Latest Reference Range & Units 08/09/24 13:19   Sodium 134 - 144 mmol/L 139   Potassium 3.5 - 5.2 mmol/L 4.7   Chloride 96 - 106 mmol/L 105   CO2 20 - 29 mmol/L 20   BUN 6 - 20 mg/dL 15   Creatinine 0.76 - 1.27 mg/dL 1.04   BUN/Creatinine Ratio 9 - 20  14   EGFR Result >59 mL/min/1.73 96   Glucose 70 - 99 mg/dL 90   Calcium 8.7 - 10.2 mg/dL 9.5   Alkaline Phosphatase 44 - 121 IU/L 35 (L)   Total Protein 6.0 - 8.5 g/dL 6.9   Albumin 4.1 - 5.1 g/dL 4.4   AST (SGOT) 0 - 40 IU/L 29   ALT (SGPT) 0 - 44 IU/L 48 (H)   Total Bilirubin 0.0 - 1.2 mg/dL 1.2   TSH Baseline 0.450 - 4.500 uIU/mL 0.570   Total Cholesterol 100 - 199 mg/dL 210 (H)   HDL Cholesterol >39 mg/dL 34 (L)   LDL Cholesterol  0 - 99 mg/dL 152 (H)   Triglycerides 0 - 149 mg/dL 129   VLDL Cholesterol Abram 5 - 40 mg/dL 24   Globulin  1.5 - 4.5 g/dL 2.5   WBC 3.4 - 10.8 x10E3/uL 8.2   RBC 4.14 - 5.80 x10E6/uL 5.02   Hemoglobin 13.0 - 17.7 g/dL 14.8   Hematocrit 37.5 - 51.0 % 45.9   Platelets 150 - 450 x10E3/uL 385   RDW 11.6 - 15.4 % 13.6   MCV 79 - 97 fL 91   MCH 26.6 - 33.0 pg 29.5   MCHC 31.5 - 35.7 g/dL 32.2   Neutrophil Rel % Not Estab. % 50   Lymphocyte Rel % Not Estab. % 39   Monocyte Rel % Not Estab. % 8   Eosinophil Rel % Not Estab. % 2   Basophil Rel % Not Estab. % 1   Immature Granulocyte Rel % Not Estab. % 0   Neutrophils Absolute 1.4 - 7.0 x10E3/uL 4.1   Lymphocytes Absolute 0.7 - 3.1 x10E3/uL 3.2 (H)   Monocytes Absolute 0.1 - 0.9 x10E3/uL 0.7   Eosinophils Absolute 0.0 - 0.4 x10E3/uL 0.1   Basophils Absolute 0.0 - 0.2 x10E3/uL 0.1   Immature Grans, Absolute 0.0 - 0.1 x10E3/uL 0.0   Color, UA Yellow  Yellow   Appearance, UA Clear  Clear   Specific Gravity, UA 1.005 - 1.030       >=1.030 !   pH, UA 5.0 - 7.5  5.5   Glucose Negative  Negative   Ketones, UA Negative  Negative   Blood, UA Negative  Negative   Nitrite, UA Negative  Negative   Leukocytes, UA Negative  Negative   Protein, UA Negative/Trace  Trace   Bilirubin, UA Negative  Negative   RBC, UA 0 - 2 /hpf 0-2   WBC, UA 0 - 5 /hpf None seen   Bacteria, UA None seen/Few  None seen   Epithelial Cells (non renal) 0 - 10 /hpf None seen   Casts None seen /lpf None seen   Microscopic Examination  Comment  See below:   Urobilinogen, UA 0.2 - 1.0 mg/dL 0.2   (L): Data is abnormally low  (H): Data is abnormally high  !: Data is abnormal                 Class 2 Severe Obesity (BMI >=35 and <=39.9). Obesity-related health conditions include the following: dyslipidemias and suspected fatty liver disease . Obesity is worsening. BMI is is above average; BMI management plan is completed. We discussed portion control and increasing exercise.            Follow Up   Return in about 6 months (around 2/13/2025), or if symptoms worsen or fail to improve, for Med Check, BP Check; Labs before if  able.  Patient was given instructions and counseling regarding his condition or for health maintenance advice. Please see specific information pulled into the AVS if appropriate.       CONY Lyn MD, FACP, FHM      Electronically signed by Alex Lyn MD, 08/13/24, 4:25 PM CDT.

## 2024-12-31 ENCOUNTER — OFFICE VISIT (OUTPATIENT)
Dept: INTERNAL MEDICINE | Facility: CLINIC | Age: 34
End: 2024-12-31
Payer: COMMERCIAL

## 2024-12-31 VITALS
HEART RATE: 78 BPM | HEIGHT: 70 IN | WEIGHT: 280 LBS | BODY MASS INDEX: 40.09 KG/M2 | SYSTOLIC BLOOD PRESSURE: 144 MMHG | OXYGEN SATURATION: 99 % | DIASTOLIC BLOOD PRESSURE: 94 MMHG | TEMPERATURE: 97.8 F

## 2024-12-31 DIAGNOSIS — E66.01 CLASS 3 SEVERE OBESITY DUE TO EXCESS CALORIES WITH SERIOUS COMORBIDITY AND BODY MASS INDEX (BMI) OF 40.0 TO 44.9 IN ADULT: ICD-10-CM

## 2024-12-31 DIAGNOSIS — E66.813 CLASS 3 SEVERE OBESITY DUE TO EXCESS CALORIES WITH SERIOUS COMORBIDITY AND BODY MASS INDEX (BMI) OF 40.0 TO 44.9 IN ADULT: ICD-10-CM

## 2024-12-31 DIAGNOSIS — N46.9 MALE INFERTILITY: ICD-10-CM

## 2024-12-31 DIAGNOSIS — I10 PRIMARY HYPERTENSION: Primary | ICD-10-CM

## 2024-12-31 PROCEDURE — 99214 OFFICE O/P EST MOD 30 MIN: CPT

## 2024-12-31 RX ORDER — CLOMIPHENE CITRATE 50 MG/1
25 TABLET ORAL DAILY
Qty: 30 TABLET | Refills: 1 | Status: SHIPPED | OUTPATIENT
Start: 2024-12-31

## 2024-12-31 RX ORDER — OLMESARTAN MEDOXOMIL 5 MG/1
5 TABLET ORAL DAILY
Qty: 30 TABLET | Refills: 0 | Status: SHIPPED | OUTPATIENT
Start: 2024-12-31

## 2024-12-31 NOTE — PROGRESS NOTES
Subjective   Slade Shah is a 34 y.o. male.   Chief Complaint   Patient presents with    Discuss BP     Patient states his BP was 148/98 on 12/27  States he would have a couple of dizzy spells that lasted 2 seconds.   States his ears were burning and had a HA.   Denies chest pains and SOB.        History of Present Illness   History of Present Illness  The patient is a 34-year-old male who presents to the office today for a blood pressure check. His history is significant for hypertension, although there are no current prescribed medications for its management, hyperlipidemia, class II severe obesity, elevated liver enzymes, and anxiety. He is prescribed Prozac 20 mg daily for anxiety and Clomid for male infertility.    He reports experiencing a burning headache localized to the frontal region of his head, which started on Friday. This headache occasionally radiates into his left eye. He also experiences transient dizzy spells lasting 1 to 2 seconds, similar to the sensation of standing up too quickly. Additionally, he describes a sensation of heat in his ears. These symptoms began abruptly on Friday, with no identifiable triggers. He reports no recent changes in his medication regimen, increased stress levels, alcohol consumption, or dietary modifications. He acknowledges that his diet has been suboptimal due to the holiday season and expresses concern about potential health implications. He reports no chest pain, heaviness, shortness of breath, or peripheral edema. He owns a home blood pressure monitor.    He has a longstanding history of visual floaters since childhood. The episodes of dizziness occur even during periods of rest and relaxation. His sleep pattern remains unaffected.    His urinary output is normal, although he notes a recent episode of cloudy urine, which he attributes to inadequate hydration, cloudiness resolved with improved water intake. He reports no diarrhea.    He has a history of  "anxiety and is currently on Prozac 20 mg daily, which he believes is effectively managing his symptoms.    FAMILY HISTORY  The patient's father had open heart surgery at the age of 53.    MEDICATIONS  Prozac, Clomid       The following portions of the patient's history were reviewed and updated as appropriate: allergies, current medications, past family history, past medical history, past social history, past surgical history and problem list.    Review of Systems    Objective   Past Medical History:   Diagnosis Date    Depression       History reviewed. No pertinent surgical history.     Current Outpatient Medications:     clomiPHENE (Clomid) 50 MG tablet, Take 0.5 tablets by mouth Daily., Disp: 30 tablet, Rfl: 1    FLUoxetine (PROzac) 20 MG capsule, Take 1 capsule by mouth Daily., Disp: 90 capsule, Rfl: 3    vitamin D (ERGOCALCIFEROL) 1.25 MG (72184 UT) capsule capsule, Take 1 capsule by mouth 1 (One) Time Per Week., Disp: 5 capsule, Rfl: 0    olmesartan (BENICAR) 5 MG tablet, Take 1 tablet by mouth Daily., Disp: 30 tablet, Rfl: 0      /94 (BP Location: Left arm, Patient Position: Sitting, Cuff Size: Adult)   Pulse 78   Temp 97.8 °F (36.6 °C) (Infrared)   Ht 177.8 cm (70\")   Wt 127 kg (280 lb)   SpO2 99%   BMI 40.18 kg/m²      Body mass index is 40.18 kg/m².          Physical Exam  Vitals and nursing note reviewed.   Constitutional:       General: He is not in acute distress.     Appearance: Normal appearance. He is obese. He is not ill-appearing, toxic-appearing or diaphoretic.   HENT:      Head: Normocephalic and atraumatic.   Eyes:      Extraocular Movements: Extraocular movements intact.      Conjunctiva/sclera: Conjunctivae normal.      Pupils: Pupils are equal, round, and reactive to light.   Cardiovascular:      Rate and Rhythm: Normal rate and regular rhythm.      Pulses: Normal pulses.      Heart sounds: Normal heart sounds.   Pulmonary:      Effort: Pulmonary effort is normal.      Breath " sounds: Normal breath sounds.   Abdominal:      General: Bowel sounds are normal.      Palpations: Abdomen is soft.   Musculoskeletal:         General: Normal range of motion.      Cervical back: Normal range of motion and neck supple.   Skin:     General: Skin is warm and dry.   Neurological:      General: No focal deficit present.      Mental Status: He is alert and oriented to person, place, and time. Mental status is at baseline.   Psychiatric:         Mood and Affect: Mood normal.         Behavior: Behavior normal.         Thought Content: Thought content normal.         Judgment: Judgment normal.               Assessment & Plan   Diagnoses and all orders for this visit:    1. Primary hypertension (Primary)  -     olmesartan (BENICAR) 5 MG tablet; Take 1 tablet by mouth Daily.  Dispense: 30 tablet; Refill: 0    2. Class 3 severe obesity due to excess calories with serious comorbidity and body mass index (BMI) of 40.0 to 44.9 in adult                 Assessment & Plan  1. Hypertension.  His blood pressure readings have consistently been elevated, with a reading of 148/98 on 12/27/2024, and today's reading of 144/94. His previous blood pressure was 132/88 here in office. He has not previously been prescribed any antihypertensive medications. His weight has changed compared to his last visit in August 2024 10+ lb. His kidney function was normal in August 2024. He is advised to engage in physical activity for at least 20 minutes daily, or every other day for the initial few weeks. Dietary recommendations include reducing portion sizes, increasing intake of fruits, vegetables, and whole grains, and avoiding high-sodium foods. He is encouraged to prepare meals at home and limit dining out. He is instructed to monitor his blood pressure at home and maintain a log of his readings, including afternoon and evening measurements. He is advised to carry his blood pressure cuff with him for the next few weeks to facilitate  monitoring during work hours. He is informed about the potential side effects of olmesartan, including hypotension, dizziness, lightheadedness, sweating, clamminess, and nausea.   A prescription for olmesartan 5-10 mg has been issued, with instructions to take two tablets daily if his blood pressure exceeds 140/90 and he experiences symptoms such as headache or dizziness. If he remains asymptomatic and his blood pressure is within more normal limits, he is advised to continue with a single 5 mg tablet.  Emphasis placed on lifestyle modifications, I do believe at his young age if he implements lifestyle modification seriously we may be able to deprescribe.  A follow-up appointment has been scheduled for 2 weeks from now, during which his kidney function and electrolyte levels will be reassessed. The prescription was sent to [facility]. Should he experience any issues prior to his scheduled follow-up in 2 weeks, he is advised to contact the office so that necessary adjustments to his treatment plan can be made.    2. Anxiety.  He is currently prescribed Prozac 20 mg daily for anxiety, which he reports has been adequately controlled.      Follow-up  The patient is scheduled for a follow-up visit in 2 weeks.    Patient or patient representative verbalized consent for the use of Ambient Listening during the visit with  LUNA Overton for chart documentation. 12/31/2024  15:25 CST    Please note that portions of this note were completed with a voice recognition program.     Electronically signed by LUNA Overton, 12/31/24, 15:25 CST.

## 2025-01-20 DIAGNOSIS — I10 PRIMARY HYPERTENSION: ICD-10-CM

## 2025-01-20 RX ORDER — OLMESARTAN MEDOXOMIL 5 MG/1
5 TABLET ORAL DAILY
Qty: 90 TABLET | Refills: 1 | Status: SHIPPED | OUTPATIENT
Start: 2025-01-20

## 2025-02-07 DIAGNOSIS — R74.8 ELEVATED LIVER ENZYMES: ICD-10-CM

## 2025-02-07 DIAGNOSIS — E78.5 HYPERLIPIDEMIA, UNSPECIFIED HYPERLIPIDEMIA TYPE: ICD-10-CM

## 2025-02-08 LAB
ALBUMIN SERPL-MCNC: 4.2 G/DL (ref 4.1–5.1)
ALP SERPL-CCNC: 46 IU/L (ref 44–121)
ALT SERPL-CCNC: 26 IU/L (ref 0–44)
AST SERPL-CCNC: 23 IU/L (ref 0–40)
BILIRUB SERPL-MCNC: 1 MG/DL (ref 0–1.2)
BUN SERPL-MCNC: 16 MG/DL (ref 6–20)
BUN/CREAT SERPL: 17 (ref 9–20)
CALCIUM SERPL-MCNC: 9.3 MG/DL (ref 8.7–10.2)
CHLORIDE SERPL-SCNC: 102 MMOL/L (ref 96–106)
CHOLEST SERPL-MCNC: 205 MG/DL (ref 100–199)
CO2 SERPL-SCNC: 20 MMOL/L (ref 20–29)
CREAT SERPL-MCNC: 0.94 MG/DL (ref 0.76–1.27)
EGFRCR SERPLBLD CKD-EPI 2021: 108 ML/MIN/1.73
GLOBULIN SER CALC-MCNC: 2.1 G/DL (ref 1.5–4.5)
GLUCOSE SERPL-MCNC: 113 MG/DL (ref 70–99)
HDLC SERPL-MCNC: 31 MG/DL
LDLC SERPL CALC-MCNC: 109 MG/DL (ref 0–99)
POTASSIUM SERPL-SCNC: 4.5 MMOL/L (ref 3.5–5.2)
PROT SERPL-MCNC: 6.3 G/DL (ref 6–8.5)
SODIUM SERPL-SCNC: 139 MMOL/L (ref 134–144)
TRIGL SERPL-MCNC: 379 MG/DL (ref 0–149)
VLDLC SERPL CALC-MCNC: 65 MG/DL (ref 5–40)

## 2025-02-13 ENCOUNTER — OFFICE VISIT (OUTPATIENT)
Dept: INTERNAL MEDICINE | Facility: CLINIC | Age: 35
End: 2025-02-13
Payer: COMMERCIAL

## 2025-02-13 VITALS
WEIGHT: 288 LBS | BODY MASS INDEX: 41.23 KG/M2 | OXYGEN SATURATION: 99 % | SYSTOLIC BLOOD PRESSURE: 130 MMHG | TEMPERATURE: 97.6 F | HEART RATE: 75 BPM | HEIGHT: 70 IN | DIASTOLIC BLOOD PRESSURE: 88 MMHG

## 2025-02-13 DIAGNOSIS — A49.2 HAEMOPHILUS INFLUENZAE INFECTION: ICD-10-CM

## 2025-02-13 DIAGNOSIS — I10 PRIMARY HYPERTENSION: ICD-10-CM

## 2025-02-13 DIAGNOSIS — Z11.59 NEED FOR HEPATITIS C SCREENING TEST: ICD-10-CM

## 2025-02-13 DIAGNOSIS — N46.9 MALE INFERTILITY: Primary | ICD-10-CM

## 2025-02-13 DIAGNOSIS — E55.9 VITAMIN D DEFICIENCY: ICD-10-CM

## 2025-02-13 DIAGNOSIS — E78.5 HYPERLIPIDEMIA, UNSPECIFIED HYPERLIPIDEMIA TYPE: ICD-10-CM

## 2025-02-13 DIAGNOSIS — Z00.00 WELLNESS EXAMINATION: ICD-10-CM

## 2025-02-13 PROBLEM — E66.813 CLASS 3 SEVERE OBESITY DUE TO EXCESS CALORIES WITH SERIOUS COMORBIDITY AND BODY MASS INDEX (BMI) OF 40.0 TO 44.9 IN ADULT: Status: ACTIVE | Noted: 2024-01-12

## 2025-02-13 PROCEDURE — 99214 OFFICE O/P EST MOD 30 MIN: CPT | Performed by: INTERNAL MEDICINE

## 2025-02-13 NOTE — PROGRESS NOTES
"      Chief Complaint  Hypertension (6 month follow up ) and Cough (Cough-productive- and congestion for a couple of weeks /So recently diagnosed with haemophilus influenza )    Subjective        Slade Shah presents to [facility]    HPI    Patient here for the above problems.  See Assessment and Plan for further HPI components.      Review of Systems    Objective   Vital Signs:  /88 (BP Location: Left arm, Patient Position: Sitting, Cuff Size: Adult)   Pulse 75   Temp 97.6 °F (36.4 °C) (Temporal)   Ht 177.8 cm (70\")   Wt 131 kg (288 lb)   SpO2 99%   BMI 41.32 kg/m²   Estimated body mass index is 41.32 kg/m² as calculated from the following:    Height as of this encounter: 177.8 cm (70\").    Weight as of this encounter: 131 kg (288 lb).      Physical Exam  Vitals and nursing note reviewed.   Constitutional:       Appearance: He is obese. He is not ill-appearing.   Eyes:      General: No scleral icterus.     Conjunctiva/sclera: Conjunctivae normal.   Cardiovascular:      Rate and Rhythm: Normal rate and regular rhythm.   Pulmonary:      Effort: Pulmonary effort is normal. No respiratory distress.   Neurological:      General: No focal deficit present.      Mental Status: He is alert and oriented to person, place, and time.   Psychiatric:         Mood and Affect: Mood normal.         Behavior: Behavior normal.                       Assessment and Plan   Diagnoses and all orders for this visit:    1. Male infertility (Primary)    2. Vitamin D deficiency  -     Vitamin D,25-Hydroxy; Future    3. Hyperlipidemia, unspecified hyperlipidemia type  -     Lipid Panel; Future  -     TSH Rfx On Abnormal To Free T4; Future    4. Primary hypertension  -     CBC & Differential; Future  -     Comprehensive Metabolic Panel; Future  -     Urinalysis With Microscopic - Urine, Clean Catch; Future    5. Wellness examination  -     CBC & Differential; Future  -     Comprehensive Metabolic " Panel; Future  -     Urinalysis With Microscopic - Urine, Clean Catch; Future  -     Lipid Panel; Future  -     TSH Rfx On Abnormal To Free T4; Future    6. Need for hepatitis C screening test  -     Hepatitis C Antibody; Future    7. Haemophilus influenzae infection  -     amoxicillin-clavulanate (AUGMENTIN) 875-125 MG per tablet; Take 1 tablet by mouth 2 (Two) Times a Day.  Dispense: 14 tablet; Refill: 0        Today is not wellness examination but labs ordered for next visit.      Patient has hypertension.  BP is  close to goal   The patient's BP goal is < 130/80.  Recommend ambulatory BP monitoring after patient has taken medication.  Recommend varying the times of the blood pressure readings.  Patient is currently on olmesartan PRN recommended he take this daily.  Recommend we continue current regimen.    Continue to monitor.      Patient has a BMI > 30.  Obesity increases risks of diseases such as diabetes, coronary artery disease, hypertension, sleep apnea, hyperlipidemia, arthritis, and stroke.  Recommend focusing on portion control and making healthy diet choices.  Avoid fast food.  Avoid calorie beverages including sweet tea, juice, soft drinks, and alcohol.  Recommend increasing your activity and starting a regular exercise program. Can consider utilizing calorie counting apps such as [employer].      Patient has a history of infertility.  Was successful with clmoid previously.  Will continue.    Patients child recently had h influenzae.  He has had cough and congestion for 2 weeks.  Lungs clear.  Will treat with augmentin.      Result Review :                               Follow Up   Return in about 6 months (around 8/13/2025), or if symptoms worsen or fail to improve, for follow up for above problems. Longitudinal care..  Patient was given instructions and counseling regarding his condition or for health maintenance advice. Please see specific information pulled into the AVS if appropriate.       CONY  Aleksandar Lyn MD, FACP, FH      Electronically signed by Alex Lyn MD, 02/13/25, 5:00 PM CST.

## 2025-03-14 DIAGNOSIS — I10 PRIMARY HYPERTENSION: ICD-10-CM

## 2025-03-14 NOTE — TELEPHONE ENCOUNTER
Caller: Slade Shah    Relationship: Self    Best call back number: [phone]     Requested Prescriptions:   Requested Prescriptions     Pending Prescriptions Disp Refills    olmesartan (BENICAR) 5 MG tablet 90 tablet 1     Sig: Take 1 tablet by mouth Daily.        Pharmacy where request should be sent: [address] - [phone]  - [phone] [facility]     Last office visit with prescribing clinician: 2/13/2025   Last telemedicine visit with prescribing clinician: Visit date not found   Next office visit with prescribing clinician: 8/28/2025     Additional details provided by patient: TOTALLY OUT    Does the patient have less than a 3 day supply:  [x] Yes  [] No    Would you like a call back once the refill request has been completed: [] Yes [x] No    If the office needs to give you a call back, can they leave a voicemail: [] Yes [x] No    Racheal Guerra Rep   03/14/25 16:11 CDT

## 2025-03-17 RX ORDER — OLMESARTAN MEDOXOMIL 5 MG/1
5 TABLET ORAL DAILY
Qty: 90 TABLET | Refills: 1 | OUTPATIENT
Start: 2025-03-17

## 2025-03-17 NOTE — TELEPHONE ENCOUNTER
This is already on file at the pharmacy.  When I called pt to inform, he had already picked it up.

## 2025-08-06 ENCOUNTER — LAB (OUTPATIENT)
Dept: LAB | Facility: HOSPITAL | Age: 35
End: 2025-08-06
Payer: COMMERCIAL

## 2025-08-06 ENCOUNTER — TRANSCRIBE ORDERS (OUTPATIENT)
Dept: ADMINISTRATIVE | Facility: HOSPITAL | Age: 35
End: 2025-08-06
Payer: COMMERCIAL

## 2025-08-06 DIAGNOSIS — F33.9 EPISODE OF RECURRENT MAJOR DEPRESSIVE DISORDER, UNSPECIFIED DEPRESSION EPISODE SEVERITY: ICD-10-CM

## 2025-08-06 DIAGNOSIS — N46.9 INFERTILITY MALE: Primary | ICD-10-CM

## 2025-08-06 DIAGNOSIS — N46.9 INFERTILITY MALE: ICD-10-CM

## 2025-08-06 LAB
CHARACTER SMN: ABNORMAL
COLLECTION METHOD SMN: ABNORMAL
DEBRIS SMN QL: ABNORMAL /HPF
LIQUEFACTION TIME SMN: ABNORMAL MIN
NON PROGRESSIVE MOTILITY: ABNORMAL
PH SMN: 8 [PH]
SEX ABSTIN DURATION TIME PATIENT: 3 DAYS
SLOW PROGRESSIVE MOTILITY: ABNORMAL
SPECIMEN VOL SMN: 2 ML
SPERM # SMN: 27.9 MILLIONS/ML
SPERM IMMOTILE NFR SMN: ABNORMAL %
SPERM MOTILE NFR SMN: 0 % MOTILE
SPERM MOTILITY INDEX: 0
SPERM PROG NFR SMN: ABNORMAL %
SPERM SMN: ABNORMAL
VISC SMN QL: NORMAL
WBC # SMN: >1 M/ML
WHO STANDARD: ABNORMAL

## 2025-08-06 PROCEDURE — 89320 SEMEN ANAL VOL/COUNT/MOT: CPT

## 2025-08-07 ENCOUNTER — PATIENT MESSAGE (OUTPATIENT)
Dept: INTERNAL MEDICINE | Facility: CLINIC | Age: 35
End: 2025-08-07
Payer: COMMERCIAL

## 2025-08-11 RX ORDER — FLUOXETINE 10 MG/1
TABLET, FILM COATED ORAL
Qty: 21 TABLET | Refills: 0 | Status: SHIPPED | OUTPATIENT
Start: 2025-08-11 | End: 2025-09-08

## 2025-08-22 DIAGNOSIS — Z11.59 NEED FOR HEPATITIS C SCREENING TEST: ICD-10-CM

## 2025-08-22 DIAGNOSIS — Z00.00 WELLNESS EXAMINATION: ICD-10-CM

## 2025-08-22 DIAGNOSIS — E55.9 VITAMIN D DEFICIENCY: ICD-10-CM

## 2025-08-22 DIAGNOSIS — I10 PRIMARY HYPERTENSION: ICD-10-CM

## 2025-08-22 DIAGNOSIS — E78.5 HYPERLIPIDEMIA, UNSPECIFIED HYPERLIPIDEMIA TYPE: ICD-10-CM

## 2025-08-23 LAB
25(OH)D3+25(OH)D2 SERPL-MCNC: 36 NG/ML (ref 30–100)
ALBUMIN SERPL-MCNC: 4.2 G/DL (ref 3.5–5.2)
ALBUMIN/GLOB SERPL: 1.5 G/DL
ALP SERPL-CCNC: 47 U/L (ref 39–117)
ALT SERPL-CCNC: 35 U/L (ref 1–41)
APPEARANCE UR: CLEAR
AST SERPL-CCNC: 28 U/L (ref 1–40)
BACTERIA #/AREA URNS HPF: NORMAL /HPF
BASOPHILS # BLD AUTO: 0.04 10*3/MM3 (ref 0–0.2)
BASOPHILS NFR BLD AUTO: 0.5 % (ref 0–1.5)
BILIRUB SERPL-MCNC: 0.8 MG/DL (ref 0–1.2)
BILIRUB UR QL STRIP: NEGATIVE
BUN SERPL-MCNC: 14 MG/DL (ref 6–20)
BUN/CREAT SERPL: 13.9 (ref 7–25)
CALCIUM SERPL-MCNC: 9.1 MG/DL (ref 8.6–10.5)
CHLORIDE SERPL-SCNC: 104 MMOL/L (ref 98–107)
CHOLEST SERPL-MCNC: 187 MG/DL (ref 0–200)
CO2 SERPL-SCNC: 21.5 MMOL/L (ref 22–29)
COLOR UR: ABNORMAL
CREAT SERPL-MCNC: 1.01 MG/DL (ref 0.76–1.27)
CRYSTALS URNS MICRO: NORMAL
EGFRCR SERPLBLD CKD-EPI 2021: 98.8 ML/MIN/1.73
EOSINOPHIL # BLD AUTO: 0.22 10*3/MM3 (ref 0–0.4)
EOSINOPHIL NFR BLD AUTO: 2.8 % (ref 0.3–6.2)
EPI CELLS #/AREA URNS HPF: NORMAL /HPF
ERYTHROCYTE [DISTWIDTH] IN BLOOD BY AUTOMATED COUNT: 13.6 % (ref 12.3–15.4)
GLOBULIN SER CALC-MCNC: 2.8 GM/DL
GLUCOSE SERPL-MCNC: 95 MG/DL (ref 65–99)
GLUCOSE UR QL STRIP: NEGATIVE
HCT VFR BLD AUTO: 39.8 % (ref 37.5–51)
HCV IGG SERPL QL IA: NON REACTIVE
HDLC SERPL-MCNC: 29 MG/DL (ref 40–60)
HGB BLD-MCNC: 13.1 G/DL (ref 13–17.7)
HGB UR QL STRIP: NEGATIVE
IMM GRANULOCYTES # BLD AUTO: 0.02 10*3/MM3 (ref 0–0.05)
IMM GRANULOCYTES NFR BLD AUTO: 0.3 % (ref 0–0.5)
KETONES UR QL STRIP: ABNORMAL
LDLC SERPL CALC-MCNC: 118 MG/DL (ref 0–100)
LEUKOCYTE ESTERASE UR QL STRIP: NEGATIVE
LYMPHOCYTES # BLD AUTO: 2.49 10*3/MM3 (ref 0.7–3.1)
LYMPHOCYTES NFR BLD AUTO: 31.5 % (ref 19.6–45.3)
MCH RBC QN AUTO: 29.8 PG (ref 26.6–33)
MCHC RBC AUTO-ENTMCNC: 32.9 G/DL (ref 31.5–35.7)
MCV RBC AUTO: 90.7 FL (ref 79–97)
MONOCYTES # BLD AUTO: 0.77 10*3/MM3 (ref 0.1–0.9)
MONOCYTES NFR BLD AUTO: 9.7 % (ref 5–12)
NEUTROPHILS # BLD AUTO: 4.36 10*3/MM3 (ref 1.7–7)
NEUTROPHILS NFR BLD AUTO: 55.2 % (ref 42.7–76)
NITRITE UR QL STRIP: NEGATIVE
NRBC BLD AUTO-RTO: 0 /100 WBC (ref 0–0.2)
PH UR STRIP: 6 [PH] (ref 5–8)
PLATELET # BLD AUTO: 363 10*3/MM3 (ref 140–450)
POTASSIUM SERPL-SCNC: 4.7 MMOL/L (ref 3.5–5.2)
PROT SERPL-MCNC: 7 G/DL (ref 6–8.5)
PROT UR QL STRIP: ABNORMAL
RBC # BLD AUTO: 4.39 10*6/MM3 (ref 4.14–5.8)
RBC #/AREA URNS HPF: NORMAL /HPF
SODIUM SERPL-SCNC: 139 MMOL/L (ref 136–145)
SP GR UR STRIP: 1.03 (ref 1–1.03)
TRIGL SERPL-MCNC: 224 MG/DL (ref 0–150)
TSH SERPL DL<=0.005 MIU/L-ACNC: 0.97 UIU/ML (ref 0.27–4.2)
UROBILINOGEN UR STRIP-MCNC: ABNORMAL MG/DL
VLDLC SERPL CALC-MCNC: 40 MG/DL (ref 5–40)
WBC # BLD AUTO: 7.9 10*3/MM3 (ref 3.4–10.8)
WBC #/AREA URNS HPF: NORMAL /HPF